# Patient Record
Sex: FEMALE | Race: WHITE | Employment: FULL TIME | URBAN - NONMETROPOLITAN AREA
[De-identification: names, ages, dates, MRNs, and addresses within clinical notes are randomized per-mention and may not be internally consistent; named-entity substitution may affect disease eponyms.]

---

## 2020-02-20 ENCOUNTER — HOSPITAL ENCOUNTER (OUTPATIENT)
Dept: PHYSICAL THERAPY | Age: 50
Setting detail: THERAPIES SERIES
Discharge: HOME | End: 2020-02-20
Payer: COMMERCIAL

## 2020-02-20 PROCEDURE — 97750 PHYSICAL PERFORMANCE TEST: CPT

## 2020-02-20 NOTE — DISCHARGE SUMMARY
[facility]  DISCHARGE NOTE  PHYSICAL THERAPY      REFERRING PHYSICIAN:  Dr. Dang Benjamin  DIAGNOSIS:  left tarsal tunnel syndrome G57.52  CLAIM #:  [id number]   DATE OF ONSET:  6/13/2018    Client in clinic for completion of functional capacity evaluation. Please refer to evaluation in chart for details. Client is discharged at this time. Thank you for allowing me to assist in the care of this patient.      Amrita Charles WA#[id number] 2/20/2020 2:46 PM

## 2020-02-20 NOTE — PROGRESS NOTES
[address] [id number]  OUTPATIENT PHYSICAL THERAPY  FUNCTIONAL CAPACITY EVALUATION GOALS    REFERRING PHYSICIAN:  Dr. Lona Candelario  DIAGNOSIS:  left tarsal tunnel syndrome G57.52  CLAIM #:  [id number]   DATE OF ONSET:  6/13/2018    Client in clinic for completion of functional capacity evaluation. Please refer to evaluation in chart for details. Thank you for allowing me to assist in the care of this patient.      SESSION START TIME:   9757    SESSION STOP TIME:  1445    SESSION DURATION:  120 minutes    Joneen Closs #[id number] 2/20/2020 2:45 PM